# Patient Record
(demographics unavailable — no encounter records)

---

## 2025-05-22 NOTE — IMAGING
[de-identified] : LEFT ANKLE  Inspection:  trace swelling medial ankle  Palpation:  tenderness vein medial ankle  Range of Motion: normal dorsiflexion, normal plantarflexion  Strength: normal   Neurovascular intact distally

## 2025-05-22 NOTE — HISTORY OF PRESENT ILLNESS
[de-identified] : May 22, 2025 Date of Injury/Onset: November 2024 Pain: At Rest: 1 With Activity: 3 Mechanism of injury:  Patient SPAIN[employer] SPEAKING, ID  SERVICES USED (id:[id number]) walking on her own, here today for left ankle pain x 6 months.  denies TETO. She saw a podiatrist, had CSI 05/02025 with mild improvement x 1 day (85%) , never did PT. Patient visited [facility] had x-rays & US done.   Pain indicated to medial malleolus radiating upwards, 8/10, constant, achy and sharp.  Worsening with certain ankle motions, walking, standing to point affecting quality of life.   Some relief with rest, CSI (temp), Tylenol with minimal relief.  PMHx Eliquis (venous insufficiency/iliofemoral) venous stents, pre-diabetic, Obesity,     Denies N/T. This is NOT a Work Related Injury being treated under Worker's Compensation. This is NOT an athletic injury occurring associated with an interscholastic or organized sports team. Quality of symptoms: swelling, stabbing pain Improves with: Tylenol, ace wrap Worse with: walking, standing Prior treatment: CSI Prior Imaging: x-rays Reports Available For Review Today: no Out of work/sport: working School/Sport/Position/Occupation:

## 2025-05-22 NOTE — DISCUSSION/SUMMARY
DC paperwork went over with patient & sister jamia at bedside. Understands new medications going home. IV & tele were removed. Transport placed to get patient.    [Medication Risks Reviewed] : Medication risks reviewed [de-identified] : Chronic L ankle pain that appears superficial in nature near the neurovascular bundle rec: PT and lidocaine patches no nsaids 2/2 eliquis RTC 6-8 weeks  next visit if no  improvement consider MR

## 2025-05-29 NOTE — ASSESSMENT
[FreeTextEntry1] : will observe. discussed that size of nodule is below the threshold for which fine needle aspiration cytology is generally recommended in the absence of any suspicious sonographic or clinical findings.  bloods drawn. to call next week for results. to return earlier if any change.  sonogram next visit. patient has been given the opportunity to ask questions, and all of the patient's questions have been answered to their satisfaction

## 2025-05-29 NOTE — PHYSICAL EXAM
[de-identified] : 3.5 cm left thyroid nodule, well circumscribed and mobile [Laryngoscopy Performed] : laryngoscopy was performed, see procedure section for findings [Midline] : located in midline position [Normal] : orientation to person, place, and time: normal

## 2025-05-29 NOTE — HISTORY OF PRESENT ILLNESS
[de-identified] : prior evaluation of thyroid nodules. below threshold for biopsy. denies any symptoms related. no changes medically since last visit. minimal weight loss with Ozempic prescribed for DM. recent sonogram stable. I have reviewed all old and new data and available images.